# Patient Record
Sex: MALE | Race: OTHER
[De-identification: names, ages, dates, MRNs, and addresses within clinical notes are randomized per-mention and may not be internally consistent; named-entity substitution may affect disease eponyms.]

---

## 2018-05-27 ENCOUNTER — HOSPITAL ENCOUNTER (EMERGENCY)
Dept: HOSPITAL 17 - NEPD | Age: 18
Discharge: HOME | End: 2018-05-27
Payer: SELF-PAY

## 2018-05-27 VITALS — BODY MASS INDEX: 23.38 KG/M2 | WEIGHT: 145.51 LBS | HEIGHT: 66 IN

## 2018-05-27 VITALS
HEART RATE: 82 BPM | TEMPERATURE: 97.8 F | OXYGEN SATURATION: 98 % | RESPIRATION RATE: 20 BRPM | SYSTOLIC BLOOD PRESSURE: 129 MMHG | DIASTOLIC BLOOD PRESSURE: 79 MMHG
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
## 2018-05-27 DIAGNOSIS — S05.12XA: ICD-10-CM

## 2018-05-27 DIAGNOSIS — S01.112A: Primary | ICD-10-CM
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
## 2018-05-27 PROCEDURE — 12011 RPR F/E/E/N/L/M 2.5 CM/<: CPT
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
